# Patient Record
Sex: MALE | Race: BLACK OR AFRICAN AMERICAN | Employment: FULL TIME | URBAN - METROPOLITAN AREA
[De-identification: names, ages, dates, MRNs, and addresses within clinical notes are randomized per-mention and may not be internally consistent; named-entity substitution may affect disease eponyms.]

---

## 2022-04-06 ENCOUNTER — OFFICE VISIT (OUTPATIENT)
Dept: ORTHOPEDIC SURGERY | Age: 52
End: 2022-04-06
Payer: COMMERCIAL

## 2022-04-06 VITALS — WEIGHT: 290 LBS | HEIGHT: 75 IN | BODY MASS INDEX: 36.06 KG/M2

## 2022-04-06 DIAGNOSIS — G89.29 CHRONIC PAIN OF BOTH KNEES: Primary | ICD-10-CM

## 2022-04-06 DIAGNOSIS — M25.561 CHRONIC PAIN OF BOTH KNEES: Primary | ICD-10-CM

## 2022-04-06 DIAGNOSIS — M25.562 CHRONIC PAIN OF BOTH KNEES: Primary | ICD-10-CM

## 2022-04-06 DIAGNOSIS — M17.0 BILATERAL PRIMARY OSTEOARTHRITIS OF KNEE: ICD-10-CM

## 2022-04-06 DIAGNOSIS — M17.0 BILATERAL PRIMARY OSTEOARTHRITIS OF KNEE: Primary | ICD-10-CM

## 2022-04-06 PROCEDURE — 99213 OFFICE O/P EST LOW 20 MIN: CPT | Performed by: ORTHOPAEDIC SURGERY

## 2022-04-06 RX ORDER — SODIUM HYALURONATE INTRA-ARTICULAR SOLN PREF SYR 25 MG/2.5ML 25/2.5 MG/ML
25 SOLUTION PREFILLED SYRINGE INTRAARTICULAR ONCE
Qty: 3 ML | Refills: 0 | Status: SHIPPED | OUTPATIENT
Start: 2022-04-06 | End: 2022-04-06

## 2022-04-06 NOTE — PROGRESS NOTES
Efrain Mcgee (: 1970) is a 45 y.o. male, patient, here for evaluation of the following chief complaint(s):  Hip Pain (bilateral knee pain ) and Joint Or Tendon Injection (requesting bilateral knee injections )       HPI:    Longstanding patient of mine who does well with hyaluronic acid injections. He has bone-on-bone DJD both knees and is requesting injections again today. He gets over 2 years of relief of symptoms. No Known Allergies    Current Outpatient Medications   Medication Sig    terbinafine (LAMISIL) 250 mg tablet Take 250 mg by mouth daily. (Patient not taking: Reported on 2022)    multivitamin (ONE A DAY) tablet Take 1 Tab by mouth daily. (Patient not taking: Reported on 2022)    ibuprofen 200 mg Cap Take 600 mg by mouth as needed. (Patient not taking: Reported on 2022)     No current facility-administered medications for this visit. Past Medical History:   Diagnosis Date    Nausea & vomiting         Past Surgical History:   Procedure Laterality Date    HX BACK SURGERY      lumbar laminectomy, discectomy    HX KNEE ARTHROSCOPY  ,     left knee, right knee       History reviewed. No pertinent family history.      Social History     Socioeconomic History    Marital status:      Spouse name: Not on file    Number of children: Not on file    Years of education: Not on file    Highest education level: Not on file   Occupational History    Not on file   Tobacco Use    Smoking status: Never Smoker    Smokeless tobacco: Never Used   Substance and Sexual Activity    Alcohol use: No    Drug use: Not on file    Sexual activity: Not on file   Other Topics Concern    Not on file   Social History Narrative    Not on file     Social Determinants of Health     Financial Resource Strain:     Difficulty of Paying Living Expenses: Not on file   Food Insecurity:     Worried About Running Out of Food in the Last Year: Not on file    [address] in the Last Year: Not on file   Transportation Needs:     Lack of Transportation (Medical): Not on file    Lack of Transportation (Non-Medical): Not on file   Physical Activity:     Days of Exercise per Week: Not on file    Minutes of Exercise per Session: Not on file   Stress:     Feeling of Stress : Not on file   Social Connections:     Frequency of Communication with Friends and Family: Not on file    Frequency of Social Gatherings with Friends and Family: Not on file    Attends [employer]: Not on file    Active Member of [address] [place] [address] [employer] or Organizations: Not on file    Attends Club or Organization Meetings: Not on file    Marital Status: Not on file   Intimate Partner Violence:     Fear of Current or Ex-Partner: Not on file    Emotionally Abused: Not on file    Physically Abused: Not on file    Sexually Abused: Not on file   Housing Stability:     Unable to Pay for Housing in the Last Year: Not on file    Number of [place] in the Last Year: Not on file    Unstable Housing in the Last Year: Not on file       ROS     Positive for: Musculoskeletal    Last edited by Gladys Garcia on 4/6/2022 11:35 AM. (History)            Vitals:  Ht 6' 3\" (1.905 m)   Wt 290 lb (131.5 kg)   BMI 36.25 kg/m²    Body mass index is 36.25 kg/m². PHYSICAL EXAM:  Bilateral knees varus alignment. Pain along medial joint lines. IMAGING:  XR Results (most recent):  Results from Appointment encounter on 04/06/22    XR KNEES BI MIN 4 V    Narrative  4 x-ray views. Both knees x-rayed. Bone-on-bone medial compartment left knee with significant medial compartment narrowing right knee. Both knees have medial compartment osteophytes and subchondral cyst.        ASSESSMENT/PLAN:  1. Chronic pain of both knees  -     XR KNEES BI MIN 4 V; Future    Good results from hyaluronic acid injections in the past.  Wants to do these again.   As long as his insurance approves them which they have always done in the past we will proceed. Injections were ordered. An electronic signature was used to authenticate this note.   --Todd Munoz MD

## 2022-11-07 ENCOUNTER — TELEPHONE (OUTPATIENT)
Dept: ORTHOPEDIC SURGERY | Age: 52
End: 2022-11-07

## 2022-11-07 NOTE — TELEPHONE ENCOUNTER
Patient called to report left knee is tight and has some swelling, redness and some warmness to the tough, patient concerned and scheduled a follow up appt. Advised patient to take anti inflammatories, ice and elevate. No fever reported, no pain in calf area.

## 2022-11-22 ENCOUNTER — OFFICE VISIT (OUTPATIENT)
Dept: ORTHOPEDIC SURGERY | Age: 52
End: 2022-11-22
Payer: COMMERCIAL

## 2022-11-22 VITALS — HEIGHT: 75 IN | BODY MASS INDEX: 36.06 KG/M2 | WEIGHT: 290 LBS

## 2022-11-22 DIAGNOSIS — M17.12 PRIMARY OSTEOARTHRITIS OF LEFT KNEE: Primary | ICD-10-CM

## 2022-11-22 NOTE — LETTER
11/22/2022    Patient: Ken Rios   YOB: 1970   Date of Visit: 11/22/2022     Omid Zamudio MD  [address] [place] [address] [place] [id number]  Via Fax: [phone]    Dear Omid Zamudio MD,      Thank you for referring Mr. Ken Rios to [facility] for evaluation. My notes for this consultation are attached. If you have questions, please do not hesitate to call me. I look forward to following your patient along with you.       Sincerely,    Nadine Penaloza MD

## 2022-11-22 NOTE — PROGRESS NOTES
Aki Kumar (: 1970) is a 45 y.o. male, patient, here for evaluation of the following chief complaint(s):  Joint Or Tendon Injection (Here today for all three Visco-3 injection in left knee )       HPI:    Patient presents today for injection into his left knee. Longstanding patient of mine. No Known Allergies    Current Outpatient Medications   Medication Sig    terbinafine (LAMISIL) 250 mg tablet Take 250 mg by mouth daily. (Patient not taking: Reported on 2022)    multivitamin (ONE A DAY) tablet Take 1 Tab by mouth daily. (Patient not taking: Reported on 2022)    ibuprofen 200 mg Cap Take 600 mg by mouth as needed. (Patient not taking: Reported on 2022)     No current facility-administered medications for this visit. Past Medical History:   Diagnosis Date    Nausea & vomiting         Past Surgical History:   Procedure Laterality Date    HX BACK SURGERY  2006    lumbar laminectomy, discectomy    HX KNEE ARTHROSCOPY  ,     left knee, right knee       History reviewed. No pertinent family history.      Social History     Socioeconomic History    Marital status:      Spouse name: Not on file    Number of children: Not on file    Years of education: Not on file    Highest education level: Not on file   Occupational History    Not on file   Tobacco Use    Smoking status: Never    Smokeless tobacco: Never   Substance and Sexual Activity    Alcohol use: No    Drug use: Not on file    Sexual activity: Not on file   Other Topics Concern    Not on file   Social History Narrative    Not on file     Social Determinants of Health     Financial Resource Strain: Not on file   Food Insecurity: Not on file   Transportation Needs: Not on file   Physical Activity: Not on file   Stress: Not on file   Social Connections: Not on file   Intimate Partner Violence: Not on file   Housing Stability: Not on file       ROS    Positive for: Musculoskeletal  Last edited by Yang Mcgill on 11/22/2022  4:15 PM.            Vitals:  Ht 6' 3\" (1.905 m)   Wt 131.5 kg (290 lb)   BMI 36.25 kg/m²    Body mass index is 36.25 kg/m². ASSESSMENT/PLAN:  1. Primary osteoarthritis of left knee  Verbal consent was given. We discussed injections. Patient provided his own hyaluronic acid. The left knee was prepped and then injected with 3 units of Visco 3. Tolerated the procedure well. No issues. He will ice tonight. Use anti-inflammatories for the injection site pain. Follow-up on an as-needed basis. An electronic signature was used to authenticate this note.   --Eric Tracy MD